# Patient Record
Sex: FEMALE | Employment: STUDENT | URBAN - METROPOLITAN AREA
[De-identification: names, ages, dates, MRNs, and addresses within clinical notes are randomized per-mention and may not be internally consistent; named-entity substitution may affect disease eponyms.]

---

## 2024-10-04 ENCOUNTER — HOSPITAL ENCOUNTER (OUTPATIENT)
Age: 19
Setting detail: SPECIMEN
Discharge: HOME | End: 2024-10-04

## 2024-10-04 ENCOUNTER — OFFICE VISIT (OUTPATIENT)
Dept: OBGYN CLINIC | Age: 19
End: 2024-10-04

## 2024-10-04 VITALS — SYSTOLIC BLOOD PRESSURE: 125 MMHG | DIASTOLIC BLOOD PRESSURE: 81 MMHG | HEIGHT: 68 IN

## 2024-10-04 DIAGNOSIS — Z76.89 ENCOUNTER TO ESTABLISH CARE WITH NEW DOCTOR: Primary | ICD-10-CM

## 2024-10-04 DIAGNOSIS — Z01.419 ENCOUNTER FOR GYNECOLOGICAL EXAMINATION: ICD-10-CM

## 2024-10-04 RX ORDER — NORGESTIMATE AND ETHINYL ESTRADIOL 0.25-0.035
1 KIT ORAL DAILY
COMMUNITY
Start: 2024-09-09 | End: 2024-10-04 | Stop reason: SDUPTHER

## 2024-10-04 ASSESSMENT — ENCOUNTER SYMPTOMS
SHORTNESS OF BREATH: 0
BACK PAIN: 0
COUGH: 0
ABDOMINAL PAIN: 0

## 2024-10-04 NOTE — PROGRESS NOTES
[facility], [facility] [place]  [address]  [address]  [facility] [address]  Dept: [phone]    Chief complaint:   Chief Complaint   Patient presents with    New Patient    Annual Exam       History Present Illness: Sydnee is a 18 yo female who presents for her annual exam and to establish care.  She had a Pap 1 year ago with her PCP.  She denies any GYN complaints.  She is on OCPs and has been on it for many years.  She was started on OCPs initially for her acne and it has helped so she has stayed on it.  She says she has regular periods with OCPs, but when she has a lot of stress she says her periods come a couple days late.  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  She is planning to take a gap year and study for MCATs and apply to med school.  She is sexually active.  She has only had male partners.  She denies any dyspareunia.  She denies any STIs.  She denies any issues with vaginal discharge or irritation.  She denies any pelvic pain.  She denies any bowel or bladder issues.      Current Medications (OTC/Herbal):   Current Outpatient Medications   Medication Sig Dispense Refill    norgestimate-ethinyl estradiol (ORTHO-CYCLEN) 0.25-35 MG-MCG per tablet Take 1 tablet by mouth daily       No current facility-administered medications for this visit.     Allergies: No Known Allergies  Past Medical History: History reviewed. No pertinent past medical history.  Past Surgical History: No past surgical history on file.  Obstetric History:   0  Para 0  Gynecologic History: LMP 22   Menarche 10  Duration 5 d    Interval q  28 d  Tampons/Pads in a day: 3-4  Last Pap: 2023 per pt       Any history of abnormal paps no    PriorColpo/Biopsy n/a     Last Mammogram n/a  Contraception: OCPs  Complications: none  STDs: none  Psychosocial History: Occupation:   Premed at [employer], going to take a gap year and apply to

## 2024-10-05 RX ORDER — NORGESTIMATE AND ETHINYL ESTRADIOL 0.25-0.035
1 KIT ORAL DAILY
Qty: 1 PACKET | Refills: 5 | Status: SHIPPED | OUTPATIENT
Start: 2024-10-05

## 2024-10-17 LAB — CYTOLOGY REPORT: NORMAL
